# Patient Record
Sex: FEMALE | URBAN - METROPOLITAN AREA
[De-identification: names, ages, dates, MRNs, and addresses within clinical notes are randomized per-mention and may not be internally consistent; named-entity substitution may affect disease eponyms.]

---

## 2020-04-16 ENCOUNTER — TELEPHONE (OUTPATIENT)
Dept: FAMILY MEDICINE CLINIC | Facility: CLINIC | Age: 40
End: 2020-04-16

## 2020-04-16 NOTE — TELEPHONE ENCOUNTER
Chart entered. Thought for sure this was 's Patient. It is not. Very simular name. Patient's Cande Bridges was in [employer].   Wanted to send note to office staff to enter  Date of death in chart to stop need for  Follow up regardi